# Patient Record
(demographics unavailable — no encounter records)

---

## 2024-12-10 NOTE — REVIEW OF SYSTEMS
[Joint Pain] : joint pain [Joint Swelling] : joint swelling [Negative] : Heme/Lymph [Joint Stiffness] : no joint stiffness [FreeTextEntry9] : foot pain

## 2024-12-10 NOTE — PHYSICAL EXAM
[de-identified] : Constitutional: Well-nourished, well-developed, No acute distress Respiratory:  Good respiratory effort, no SOB   Lymphatic: No regional lymphadenopathy, no lymphedema Psychiatric: Pleasant and normal affect, alert and oriented x3 Skin: Clean dry and intact B/L UE/LE Musculoskeletal: normal except where as noted in regional exam   Right Foot: APPEARANCE: no marked deformities, no swelling or malalignment POSITIVE TENDERNESS: 5th metatarsal base NONTENDER: cuboid, 1st MTP, dorsum & plantar surfaces, medial heel, mid heel.  ROM: normal throughout foot, ankle, and digits.  RESISTIVE TESTING: pain with flex, abd painless with ext/add of all digits.  SPECIAL TESTS:  neg Tinel's at tarsal tunnel.  NEURO: Normal sensation of LE, DTRs 2+/4 patella and achilles PULSES: 2+ DP/PT pulses  B/L Knees: No asymmetry, malalignment, or swelling, Full ROM, 5/5 strength in Flex/Ext, Joints stable B/L Ankles: No asymmetry, malalignment, or swelling, Full ROM, 5/5 strength in DF/PF/Inv/Ev, Joints stable BIOMECHANICAL EXAM: no marked leg length discrepancy, [default value]hip abductor weakness b/l, no marked foot pronation, tight hams and ITB b/l.  Normal gait and station    Right Ankle: APPEARANCE: mild swelling, no marked deformities or malalignment POSITIVE TENDERNESS: 5th metatarsal NONTENDER: medial malleolus, lateral malleolus, tibialis posterior tendon, achilles tendon, no marked thickening of tendon, PTFL, anterior tibiofibular ligament (high ankle), sinus tarsus, deltoid ligaments,  ATF, CFL, Lateral ankle RANGE OF MOTION: Normal ROM of PF and Inversion, NL DF and Eversion.  RESISTIVE TESTING: Mild pain with resisted eversion and DF and Eversion.  painless resisted plantar flexion, and inversion.  SPECIAL TESTS: - anterior drawer for pain without laxity, + talar tilt for pain without laxity, neg Kleiger's  RIGHT Wrist: APPEARANCE: no marked deformities, no swelling or malalignment POSITIVE TENDERNESS: dorsal radiocarpal joint.  ROM: full, pain with wrist extension RESISTIVE TESTIN/5 resisted wrist flexion/extension, and radial/ulnar deviation.  SPECIAL TESTS: neg Finkelstein's. neg Phalen's. neg reverse Phalen's. neg Bernabe's. neg georgette test. neg TFCC grind. neg tinel's at the carpal tunnel Vasc: 2+ radial pulse Neuro: AIN, PIN, Ulnar nerve intact to motor Sensation: Intact to light touch throughout [de-identified] : Start Imaging: The following radiographs were ordered and read by me during this patient's visit. I reviewed each radiograph in detail with the patient and discussed the findings as highlighted below.   3 Views of the right foot were obtained today that show fracture base of the fifth metatarsal, interval healing.

## 2024-12-10 NOTE — HISTORY OF PRESENT ILLNESS
[de-identified] : GOLDBERG,FRANESSA 57F who presents for follow up of right 5th metatarsal fracture. Injury was 10/17/2024 when she "rolled her foot" while walking on uneven pavement. Today she reports she has been compliant with the walking boot since last visit. She reports some improvement with her symptoms. Main complaint is mild pain if walking without the boot.   Reports that she is awaiting an MRI of the right wrist pain. No changes since the last visit. Right wrist pain has been present for a few years, worse within the past month. Denies injury or trauma. Pain is worse with wrist extension, now prevents her from weight bearing on the wrist. Patient states she used to do yoga regularly, now unable to because of the pain. Denies swelling. No prior injury Denies numbness, tingling, weakness of the lower extremities.

## 2024-12-10 NOTE — DISCUSSION/SUMMARY
[de-identified] : Imelda presents for follow up of right fifth metatarsal fracture, healing. Discontinue boot. Referral to physical therapy provided.   Patient also complaining of right wrist pain, concerning for occult ganglion. Follow up after wrist MRI. Patient was instructed to call the office when MRI is complete to set up a follow up appointment for discussion of results and further treatment. A phone call will only be made to the patient in the case of urgent findings requiring immediate attention.   Dorothy Lucio MD, [facility] Department of Orthopedics  [facility], assisted with documentation on 11/19/2024 acting as scribe for Dr. Dorothy Lucio.   Dorothy REGALADO MD, Vinod, personally performed the services described in the documentation, reviewed the documentation recorded by the scribe in my presence and it accurately and completely records my words and actions.

## 2024-12-10 NOTE — DISCUSSION/SUMMARY
[de-identified] : Imelda presents for follow up of right fifth metatarsal fracture, healing. Discontinue boot. Referral to physical therapy provided.   Patient also complaining of right wrist pain, concerning for occult ganglion. Follow up after wrist MRI. Patient was instructed to call the office when MRI is complete to set up a follow up appointment for discussion of results and further treatment. A phone call will only be made to the patient in the case of urgent findings requiring immediate attention.   Dorothy Lucio MD, [facility] Department of Orthopedics  [facility], assisted with documentation on 11/19/2024 acting as scribe for Dr. Dorothy Lucio.   Dorothy REGALADO MD, Vinod, personally performed the services described in the documentation, reviewed the documentation recorded by the scribe in my presence and it accurately and completely records my words and actions.  Normal vision: sees adequately in most situations; can see medication labels, newsprint

## 2024-12-10 NOTE — HISTORY OF PRESENT ILLNESS
[de-identified] : GOLDBERG,FRANESSA 57F who presents for follow up of right 5th metatarsal fracture. Injury was 10/17/2024 when she "rolled her foot" while walking on uneven pavement. Today she reports she has been compliant with the walking boot since last visit. She reports some improvement with her symptoms. Main complaint is mild pain if walking without the boot.   Reports that she is awaiting an MRI of the right wrist pain. No changes since the last visit. Right wrist pain has been present for a few years, worse within the past month. Denies injury or trauma. Pain is worse with wrist extension, now prevents her from weight bearing on the wrist. Patient states she used to do yoga regularly, now unable to because of the pain. Denies swelling. No prior injury Denies numbness, tingling, weakness of the lower extremities.

## 2024-12-10 NOTE — PHYSICAL EXAM
[de-identified] : Constitutional: Well-nourished, well-developed, No acute distress Respiratory:  Good respiratory effort, no SOB   Lymphatic: No regional lymphadenopathy, no lymphedema Psychiatric: Pleasant and normal affect, alert and oriented x3 Skin: Clean dry and intact B/L UE/LE Musculoskeletal: normal except where as noted in regional exam   Right Foot: APPEARANCE: no marked deformities, no swelling or malalignment POSITIVE TENDERNESS: 5th metatarsal base NONTENDER: cuboid, 1st MTP, dorsum & plantar surfaces, medial heel, mid heel.  ROM: normal throughout foot, ankle, and digits.  RESISTIVE TESTING: pain with flex, abd painless with ext/add of all digits.  SPECIAL TESTS:  neg Tinel's at tarsal tunnel.  NEURO: Normal sensation of LE, DTRs 2+/4 patella and achilles PULSES: 2+ DP/PT pulses  B/L Knees: No asymmetry, malalignment, or swelling, Full ROM, 5/5 strength in Flex/Ext, Joints stable B/L Ankles: No asymmetry, malalignment, or swelling, Full ROM, 5/5 strength in DF/PF/Inv/Ev, Joints stable BIOMECHANICAL EXAM: no marked leg length discrepancy, [default value]hip abductor weakness b/l, no marked foot pronation, tight hams and ITB b/l.  Normal gait and station    Right Ankle: APPEARANCE: mild swelling, no marked deformities or malalignment POSITIVE TENDERNESS: 5th metatarsal NONTENDER: medial malleolus, lateral malleolus, tibialis posterior tendon, achilles tendon, no marked thickening of tendon, PTFL, anterior tibiofibular ligament (high ankle), sinus tarsus, deltoid ligaments,  ATF, CFL, Lateral ankle RANGE OF MOTION: Normal ROM of PF and Inversion, NL DF and Eversion.  RESISTIVE TESTING: Mild pain with resisted eversion and DF and Eversion.  painless resisted plantar flexion, and inversion.  SPECIAL TESTS: - anterior drawer for pain without laxity, + talar tilt for pain without laxity, neg Kleiger's  RIGHT Wrist: APPEARANCE: no marked deformities, no swelling or malalignment POSITIVE TENDERNESS: dorsal radiocarpal joint.  ROM: full, pain with wrist extension RESISTIVE TESTIN/5 resisted wrist flexion/extension, and radial/ulnar deviation.  SPECIAL TESTS: neg Finkelstein's. neg Phalen's. neg reverse Phalen's. neg Bernabe's. neg gerogette test. neg TFCC grind. neg tinel's at the carpal tunnel Vasc: 2+ radial pulse Neuro: AIN, PIN, Ulnar nerve intact to motor Sensation: Intact to light touch throughout [de-identified] : Start Imaging: The following radiographs were ordered and read by me during this patient's visit. I reviewed each radiograph in detail with the patient and discussed the findings as highlighted below.   3 Views of the right foot were obtained today that show fracture base of the fifth metatarsal, interval healing.

## 2024-12-18 NOTE — END OF VISIT
[FreeTextEntry3] : This note was written by Rajendra Conn on 12/18/2024 acting solely as a scribe for Dr. Patrick Vargas.   All medical record entries made by the Scribe were at my, Dr. Patrick Vargas, direction and personally dictated by me on 12/18/2024. I have personally reviewed the chart and agree that the record accurately reflects my personal performance of the history, physical exam, assessment and plan.

## 2024-12-18 NOTE — ADDENDUM
[FreeTextEntry1] :  I, Rajendra Conn, acted solely as a scribe for Dr. Vargas on this date on 12/18/2024.

## 2024-12-18 NOTE — DISCUSSION/SUMMARY
[FreeTextEntry1] : She has findings consistent with several months of intermittent left wrist pain.  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  She does not have localized findings on examination in the region of the TFCC ligament or ECU tendon.   I had a discussion with the patient regarding today's visit, the prognosis of this diagnosis, and treatment recommendations and options. At this time, I discussed treatment options of observation or a cortisone injection, which she defers. I recommended for her to purchase a wrist widget. She will follow up as needed.    The patient has agreed to the above plan of management and has expressed full understanding. All questions were fully answered to the patient's satisfaction.   My cumulative time spent on this visit included: Preparation for the visit, review of the medical records, review of pertinent diagnostic studies, examination and counseling of the patient on the above diagnosis, treatment plan and prognosis, orders of diagnostic tests, medication and/or appropriate procedures and documentation in the medical records of today's visit.

## 2024-12-18 NOTE — HISTORY OF PRESENT ILLNESS
[Right] : right hand dominant [FreeTextEntry1] : She comes in today for evaluation of left wrist pain for the past few months after a can fell on her hand. She denies any numbness or tingling.

## 2024-12-18 NOTE — PHYSICAL EXAM
[de-identified] : - Constitutional: This is a female in no obvious distress.  - Psych: Patient is alert and oriented to person, place and time.  Patient has a normal mood and affect. - Cardiovascular: Normal pulses throughout the upper extremities.  No significant varicosities are noted in the upper extremities. - Neuro: Strength and sensation are intact throughout the upper extremities.  Patient has normal coordination. - Respiratory:  Patient exhibits no evidence of shortness of breath or difficulty breathing. - Skin: No rashes, lesions, or other abnormalities are noted in the upper extremities. --- Examination of her left wrist and hand demonstrates no obvious swelling.  There is a localized swelling or tenderness along the TFCC ligament or ECU tendon.  There is no pain with ulnar deviation or pronation supination.  She has complaints of pain with certain activities which cannot be elicited today.  She is neurovascularly intact distally. [de-identified] : An MRI of the left wrist dated 12/12/2024 demonstrated:  There is high-grade partial tearing involving the ulnar styloid attachment of the triangular fibrocartilage complex. The triangular fibrocartilage complex is otherwise intact. Moderate extensor carpi ulnaris tendinosis with superimposed segmental longitudinal split tear.

## 2025-01-15 NOTE — PHYSICAL EXAM
[de-identified] : - Constitutional: This is a female in no obvious distress.  - Psych: Patient is alert and oriented to person, place and time.  Patient has a normal mood and affect. - Cardiovascular: Normal pulses throughout the upper extremities.  No significant varicosities are noted in the upper extremities. - Neuro: Strength and sensation are intact throughout the upper extremities.  Patient has normal coordination. - Respiratory:  Patient exhibits no evidence of shortness of breath or difficulty breathing. - Skin: No rashes, lesions, or other abnormalities are noted in the upper extremities. --- Examination of her left wrist and hand demonstrates no obvious swelling.  There is a localized swelling or tenderness along the TFCC ligament or ECU tendon.  There is no pain with ulnar deviation or pronation supination.  She has complaints of pain with certain activities which cannot be elicited today.  She is neurovascularly intact distally. [de-identified] : An MRI of the left wrist dated 12/12/2024 demonstrated:  There is high-grade partial tearing involving the ulnar styloid attachment of the triangular fibrocartilage complex. The triangular fibrocartilage complex is otherwise intact. Moderate extensor carpi ulnaris tendinosis with superimposed segmental longitudinal split tear.

## 2025-01-15 NOTE — HISTORY OF PRESENT ILLNESS
[FreeTextEntry1] : Follow-up regarding left wrist pain.  See note from when she was seen in the office 6 weeks ago.  I recommended use of a wrist widget.  She returns today,

## 2025-01-22 NOTE — PHYSICAL EXAM
[de-identified] : - Constitutional: This is a female in no obvious distress.  - Psych: Patient is alert and oriented to person, place and time.  Patient has a normal mood and affect. - Cardiovascular: Normal pulses throughout the upper extremities.  No significant varicosities are noted in the upper extremities. - Neuro: Strength and sensation are intact throughout the upper extremities.  Patient has normal coordination. - Respiratory:  Patient exhibits no evidence of shortness of breath or difficulty breathing. - Skin: No rashes, lesions, or other abnormalities are noted in the upper extremities. --- Examination of her left wrist and hand demonstrates no obvious swelling.  There is a localized swelling or tenderness along the TFCC ligament or ECU tendon.  There is no pain with ulnar deviation or pronation supination.  She has complaints of pain with certain activities which cannot be elicited today.  She is neurovascularly intact distally. [de-identified] : An MRI of the left wrist dated 12/12/2024 demonstrated:  There is high-grade partial tearing involving the ulnar styloid attachment of the triangular fibrocartilage complex. The triangular fibrocartilage complex is otherwise intact. Moderate extensor carpi ulnaris tendinosis with superimposed segmental longitudinal split tear.

## 2025-05-30 NOTE — PHYSICAL EXAM
[Well Developed] : well developed [Well Nourished] : well nourished [No Acute Distress] : no acute distress [Normal Conjunctiva] : normal conjunctiva [Normal Venous Pressure] : normal venous pressure [No Carotid Bruit] : no carotid bruit [Normal S1, S2] : normal S1, S2 [No Murmur] : no murmur [No Rub] : no rub [No Gallop] : no gallop [Clear Lung Fields] : clear lung fields [Good Air Entry] : good air entry [No Respiratory Distress] : no respiratory distress  [Soft] : abdomen soft [Normal Bowel Sounds] : normal bowel sounds [Normal Gait] : normal gait [No Edema] : no edema [No Cyanosis] : no cyanosis [No Clubbing] : no clubbing [No Varicosities] : no varicosities [No Rash] : no rash [No Skin Lesions] : no skin lesions [Moves all extremities] : moves all extremities [No Focal Deficits] : no focal deficits [Normal Speech] : normal speech [Alert and Oriented] : alert and oriented [Normal memory] : normal memory

## 2025-06-04 NOTE — HISTORY OF PRESENT ILLNESS
[FreeTextEntry1] : No new medical issues. She is being medications as directed. Palpitations are not prominent.  Prior: She reports feeling well overall and is able to go about her usual activities without difficulty.  She has been taking her medication and denies any significant episodes of palpitations.  Prior: She returns today for follow up of her palpitations. Recently she has noticed an increase in the frequency of her palpitations as she has been under increased stress regarding her high school son. She describes no chest discomfort, shortness of breath, palpitations, lightheadedness or syncope and remains active by taking barre classes and doing Yoga. She has been taking ashwagandha supplements to help with her stress and anxiety.  Prior: She returns today for follow up. Since her last visit she has noticed some improvement in her nocturnal palpitations. She continues to check her blood pressure at home and reports that her diastolic readings are consistently between 80-95, and she is concerned.  Although she has a significant amount of stress in her life, undergoing cardiac evaluation has only escalated her anxiety. She is planning to return to her previous exercise routine by using her [employer] bike for both improved fitness and stress reduction.  She has had no issue since starting metoprolol aside from some sleepiness as she has been taking the medication 2 hours before sleep with the hopes of calming her palpitations.    Prior: Thank you for referring her for cardiovascular evaluation.  She is a 54-year-old who recently noted episodes of palpitations.  She describes feeling like her heart is racing when she is laying down prior to going to sleep.  There were no other associated symptoms at this time they pass spontaneously.  She is able to go to sleep without much difficulty. She denies any palpitations during her usual activities of daily living. She has no history of coronary artery disease, hypertension, diabetes mellitus, smoking or family history of premature coronary artery disease.  She does have borderline hyperlipidemia. I reviewed her lab work taken earlier this week and there are no significant abnormalities aside from her elevated LDL (which was improved compared with the one done a few months earlier).

## 2025-06-04 NOTE — HISTORY OF PRESENT ILLNESS
[FreeTextEntry1] : No new medical issues. She is being medications as directed. Palpitations are not prominent.  Prior: She reports feeling well overall and is able to go about her usual activities without difficulty.  She has been taking her medication and denies any significant episodes of palpitations.  Prior: She returns today for follow up of her palpitations. Recently she has noticed an increase in the frequency of her palpitations as she has been under increased stress regarding her high school son. She describes no chest discomfort, shortness of breath, palpitations, lightheadedness or syncope and remains active by taking barre classes and doing Yoga. She has been taking ashwagandha supplements to help with her stress and anxiety.  Prior: She returns today for follow up. Since her last visit she has noticed some improvement in her nocturnal palpitations. She continues to check her blood pressure at home and reports that her diastolic readings are consistently between 80-95, and she is concerned.  Although she has a significant amount of stress in her life, undergoing cardiac evaluation has only escalated her anxiety. She is planning to return to her previous exercise routine by using her [employer] bike for both improved fitness and stress reduction.  She has had no issue since starting metoprolol aside from some sleepiness as she has been taking the medication 2 hours before sleep with the hopes of calming her palpitations.    Prior: Thank you for referring her for cardiovascular evaluation.  She is a 54-year-old who recently noted episodes of palpitations.  She describes feeling like her heart is racing when she is laying down prior to going to sleep.  There were no other associated symptoms at this time they pass spontaneously.  She is able to go to sleep without much difficulty. She denies any palpitations during her usual activities of daily living. She has no history of coronary artery disease, hypertension, diabetes mellitus, smoking or family history of premature coronary artery disease.  She does have borderline hyperlipidemia. I reviewed her lab work taken earlier this week and there are no significant abnormalities aside from her elevated LDL (which was improved compared with the one done a few months earlier).

## 2025-06-04 NOTE — DISCUSSION/SUMMARY
[FreeTextEntry1] : She is a 57 year old with hypertension, Borderline hyperlipidemia and palpitations.   Hypertension: Well-controlled on her current regimen.  No changes have been made. Palpitations: None of late. Continue metoprolol daily. Prior LDL was Previously elevated. We discussed coronary calcium scoring as a way to risk stratify her for whether or not statins would be beneficial. She agrees to proceed. Lab work with her PMD in the next month. Final decision will hinge on an elevated coronary calcium score over 100 or not. [EKG obtained to assist in diagnosis and management of assessed problem(s)] : EKG obtained to assist in diagnosis and management of assessed problem(s)

## 2025-06-04 NOTE — HISTORY OF PRESENT ILLNESS
[FreeTextEntry1] : No new medical issues. She is being medications as directed. Palpitations are not prominent.  Prior: She reports feeling well overall and is able to go about her usual activities without difficulty.  She has been taking her medication and denies any significant episodes of palpitations.  Prior: She returns today for follow up of her palpitations. Recently she has noticed an increase in the frequency of her palpitations as she has been under increased stress regarding her high school son. She describes no chest discomfort, shortness of breath, palpitations, lightheadedness or syncope and remains active by taking barre classes and doing Yoga. She has been taking ashwagandha supplements to help with her stress and anxiety.  Prior: She returns today for follow up. Since her last visit she has noticed some improvement in her nocturnal palpitations. She continues to check her blood pressure at home and reports that her diastolic readings are consistently between 80-95, and she is concerned.  Although she has a significant amount of stress in her life, undergoing cardiac evaluation has only escalated her anxiety. She is planning to return to her previous exercise routine by using her [employer] bike for both improved fitness and stress reduction.  She has had no issue since starting metoprolol aside from some sleepiness as she has been taking the medication 2 hours before sleep with the hopes of calming her palpitations.    Prior: Thank you for referring her for cardiovascular evaluation.  She is a 54-year-old who recently noted episodes of palpitations.  She describes feeling like her heart is racing when she is laying down prior to going to sleep.  There were no other associated symptoms at this time they pass spontaneously.  She is able to go to sleep without much difficulty. She denies any palpitations during her usual activities of daily living. She has no history of coronary artery disease, hypertension, diabetes mellitus, smoking or family history of premature coronary artery disease.  She does have borderline hyperlipidemia. I reviewed her lab work taken earlier this week and there are no significant abnormalities aside from her elevated LDL (which was improved compared with the one done a few months earlier).

## 2025-06-09 NOTE — END OF VISIT
[FreeTextEntry3] : I Alcides Conn, acted as a scribe on behalf of Dr. Subhash Li on 06/09/2025.  All medical entries made by this scribe where at my Dr. Subhash Li, direction and personally dictated by me on 06/09/2025.  I have reviewed the chart and agree that the record accurately reflects my personal performance of the history, physical exam, assessment, and plan. I have also personally directed, reviewed and agreed with the chart.

## 2025-06-09 NOTE — PLAN
[FreeTextEntry1] : 58 yo for annual visit.   HCM -pap smear today -rx mammo  -colonoscopy advised -Discussed hormone replacement therapy of 0.0375 mg estrogen patch and progesterone 100 mg pill -rto 1 year

## 2025-06-09 NOTE — PHYSICAL EXAM
[MA] : MA [Appropriately responsive] : appropriately responsive [No Acute Distress] : no acute distress [Alert] : alert [No Lymphadenopathy] : no lymphadenopathy [Soft] : soft [Non-distended] : non-distended [Non-tender] : non-tender [No Lesions] : no lesions [No HSM] : No HSM [No Mass] : no mass [Examination Of The Breasts] : a normal appearance [Oriented x3] : oriented x3 [No Masses] : no breast masses were palpable [Labia Majora] : normal [Labia Minora] : normal [Normal] : normal [Uterine Adnexae] : normal [FreeTextEntry2] : Josefina

## 2025-06-09 NOTE — PHYSICAL EXAM
[MA] : MA [Appropriately responsive] : appropriately responsive [No Acute Distress] : no acute distress [Alert] : alert [No Lymphadenopathy] : no lymphadenopathy [Soft] : soft [Non-distended] : non-distended [Non-tender] : non-tender [No Lesions] : no lesions [No HSM] : No HSM [No Mass] : no mass [Oriented x3] : oriented x3 [Examination Of The Breasts] : a normal appearance [No Masses] : no breast masses were palpable [Labia Majora] : normal [Labia Minora] : normal [Normal] : normal [Uterine Adnexae] : normal [FreeTextEntry2] : Josefina

## 2025-06-09 NOTE — PLAN
[FreeTextEntry1] : 56 yo for annual visit.   HCM -pap smear today -rx mammo  -colonoscopy advised -Discussed hormone replacement therapy of 0.0375 mg estrogen patch and progesterone 100 mg pill -rto 1 year

## 2025-07-01 NOTE — HISTORY OF PRESENT ILLNESS
[FreeTextEntry1] : Comes in for annual physical. [de-identified] : Feeling well. Had covid 2021 and 2023.

## 2025-07-01 NOTE — ASSESSMENT
[FreeTextEntry1] : See health maintenance assessment and plan outlined below. In addition: Full labs and urine testing done today She wishes to do bone density testing as per GYN Dr. Li Ortho f/u 2025 Urology f/u as needed 2025 General surgery f/u as needed 2025 Had colonoscopy 2019 = due in 2029 as per GI Saw GYN 6/2025 Had mammograms 6/2025 Continue meds, diet, exercise as outlined Just saw Cardiology 2025 Had EKG with cardiology = declined here today Can consider CXR and PFT's 2025 Vaccines reviewed ENT follow-up 2025 Dental follow-up 2025 To see ophthalmology 2025 for complete eye exam Derm follow-up 2025 for full skin exam RTC for annual CPE and as needed To call for any medical issues or if her status changes/worsens and to return to be seen immediately All of the above was discussed in detail with patient and all of her questions were answered She verbally confirmed understanding of all of the above and agreement with the above plan [Vaccines Reviewed] : Immunizations reviewed today. Please see immunization details in the vaccine log within the immunization flowsheet.

## 2025-07-01 NOTE — PHYSICAL EXAM
[No Acute Distress] : no acute distress [Well Nourished] : well nourished [Well Developed] : well developed [Well-Appearing] : well-appearing [Normal Voice/Communication] : normal voice/communication [Normal Sclera/Conjunctiva] : normal sclera/conjunctiva [PERRL] : pupils equal round and reactive to light [EOMI] : extraocular movements intact [Normal Outer Ear/Nose] : the outer ears and nose were normal in appearance [Normal Oropharynx] : the oropharynx was normal [Normal TMs] : both tympanic membranes were normal [No JVD] : no jugular venous distention [Supple] : supple [No Lymphadenopathy] : no lymphadenopathy [No Respiratory Distress] : no respiratory distress  [Clear to Auscultation] : lungs were clear to auscultation bilaterally [No Accessory Muscle Use] : no accessory muscle use [Normal Rate] : normal rate  [Regular Rhythm] : with a regular rhythm [Normal S1, S2] : normal S1 and S2 [No Carotid Bruits] : no carotid bruits [Pedal Pulses Present] : the pedal pulses are present [No Edema] : there was no peripheral edema [No Extremity Clubbing/Cyanosis] : no extremity clubbing/cyanosis [Normal Appearance] : normal in appearance [No Nipple Discharge] : no nipple discharge [No Axillary Lymphadenopathy] : no axillary lymphadenopathy [Soft] : abdomen soft [Non Tender] : non-tender [Non-distended] : non-distended [No Masses] : no abdominal mass palpated [No HSM] : no HSM [Normal Bowel Sounds] : normal bowel sounds [Declined Rectal Exam] : declined rectal exam [Normal Supraclavicular Nodes] : no supraclavicular lymphadenopathy [Normal Axillary Nodes] : no axillary lymphadenopathy [Normal Posterior Cervical Nodes] : no posterior cervical lymphadenopathy [Normal Anterior Cervical Nodes] : no anterior cervical lymphadenopathy [No CVA Tenderness] : no CVA  tenderness [No Spinal Tenderness] : no spinal tenderness [Grossly Normal Strength/Tone] : grossly normal strength/tone [No Rash] : no rash [Normal Gait] : normal gait [Coordination Grossly Intact] : coordination grossly intact [No Focal Deficits] : no focal deficits [Speech Grossly Normal] : speech grossly normal [Normal Affect] : the affect was normal [Alert and Oriented x3] : oriented to person, place, and time [Memory Grossly Normal] : memory grossly normal [Normal Mood] : the mood was normal [Normal Insight/Judgement] : insight and judgment were intact [de-identified] : wearing glasses [de-identified] : No ST [de-identified] : No stridor or TM [de-identified] : R=16 [de-identified] : normal radial pulses; no cords [de-identified] : As per GYN

## 2025-07-01 NOTE — HEALTH RISK ASSESSMENT
[Very Good] : ~his/her~  mood as very good [Yes] : Yes [Monthly or less (1 pt)] : Monthly or less (1 point) [1 or 2 (0 pts)] : 1 or 2 (0 points) [Never (0 pts)] : Never (0 points) [No] : In the past 12 months have you used drugs other than those required for medical reasons? No [No falls in past year] : Patient reported no falls in the past year [0] : 2) Feeling down, depressed, or hopeless: Not at all (0) [PHQ-2 Negative - No further assessment needed] : PHQ-2 Negative - No further assessment needed [Never] : Never [NO] : No [Patient declined bone density test] : Patient declined bone density test [HIV test declined] : HIV test declined [Hepatitis C test declined] : Hepatitis C test declined [With Family] : lives with family [# of Members in Household ___] :  household currently consist of [unfilled] member(s) [Employed] : employed [Graduate School] : graduate school [] :  [# Of Children ___] : has [unfilled] children [Feels Safe at Home] : Feels safe at home [Fully functional (bathing, dressing, toileting, transferring, walking, feeding)] : Fully functional (bathing, dressing, toileting, transferring, walking, feeding) [Fully functional (using the telephone, shopping, preparing meals, housekeeping, doing laundry, using] : Fully functional and needs no help or supervision to perform IADLs (using the telephone, shopping, preparing meals, housekeeping, doing laundry, using transportation, managing medications and managing finances) [Smoke Detector] : smoke detector [Carbon Monoxide Detector] : carbon monoxide detector [Seat Belt] :  uses seat belt [Sunscreen] : uses sunscreen [With Patient/Caregiver] : , with patient/caregiver [Reviewed no changes] : Reviewed, no changes [Designated Healthcare Proxy] : Designated healthcare proxy [Name: ___] : Health Care Proxy's Name: [unfilled]  [Relationship: ___] : Relationship: [unfilled] [Intercurrent Urgi Care visits] : went to urgent care [Little interest or pleasure doing things] : 1) Little interest or pleasure doing things [Feeling down, depressed, or hopeless] : 2) Feeling down, depressed, or hopeless [PHQ-9 Negative - No further assessment needed] : PHQ-9 Negative - No further assessment needed [de-identified] : ortho, cardiology, GYN, dentist, ophtho, derm [Audit-CScore] : 1 [de-identified] : exercises [de-identified] : regular [[id number]] : 0 [Change in mental status noted] : No change in mental status noted [Reports changes in hearing] : Reports no changes in hearing [Reports changes in vision] : Reports no changes in vision [Reports changes in dental health] : Reports no changes in dental health [Travel to Developing Areas] : does not  travel to developing areas [TB Exposure] : is not being exposed to tuberculosis [Caregiver Concerns] : does not have caregiver concerns [MammogramDate] : 06/25 [PapSmearDate] : 06/25 [ColonoscopyDate] : 05/19 [ColonoscopyComments] : 2029 [FreeTextEntry2] : teacher [AdvancecareDate] : 06/25